# Patient Record
Sex: FEMALE | Race: WHITE | Employment: PART TIME | URBAN - METROPOLITAN AREA
[De-identification: names, ages, dates, MRNs, and addresses within clinical notes are randomized per-mention and may not be internally consistent; named-entity substitution may affect disease eponyms.]

---

## 2017-08-16 ENCOUNTER — OFFICE VISIT - HEALTHEAST (OUTPATIENT)
Dept: FAMILY MEDICINE | Facility: CLINIC | Age: 22
End: 2017-08-16

## 2017-08-16 DIAGNOSIS — S32.10XA SACRAL FRACTURE (H): ICD-10-CM

## 2017-08-16 DIAGNOSIS — S62.91XA HAND FRACTURE, RIGHT: ICD-10-CM

## 2017-10-11 ENCOUNTER — OFFICE VISIT (OUTPATIENT)
Dept: URGENT CARE | Facility: URGENT CARE | Age: 22
End: 2017-10-11
Payer: COMMERCIAL

## 2017-10-11 VITALS
WEIGHT: 205.7 LBS | TEMPERATURE: 98.9 F | DIASTOLIC BLOOD PRESSURE: 75 MMHG | SYSTOLIC BLOOD PRESSURE: 121 MMHG | HEART RATE: 101 BPM | OXYGEN SATURATION: 95 %

## 2017-10-11 DIAGNOSIS — J20.9 ACUTE BRONCHITIS WITH COEXISTING CONDITION REQUIRING PROPHYLACTIC TREATMENT: Primary | ICD-10-CM

## 2017-10-11 DIAGNOSIS — R07.0 THROAT PAIN: ICD-10-CM

## 2017-10-11 DIAGNOSIS — J45.21 MILD INTERMITTENT ASTHMA WITH ACUTE EXACERBATION: ICD-10-CM

## 2017-10-11 DIAGNOSIS — R06.02 SOB (SHORTNESS OF BREATH): ICD-10-CM

## 2017-10-11 PROBLEM — J45.20 MILD INTERMITTENT ASTHMA: Status: ACTIVE | Noted: 2017-10-11

## 2017-10-11 PROCEDURE — 94640 AIRWAY INHALATION TREATMENT: CPT | Performed by: FAMILY MEDICINE

## 2017-10-11 PROCEDURE — 99204 OFFICE O/P NEW MOD 45 MIN: CPT | Mod: 25 | Performed by: FAMILY MEDICINE

## 2017-10-11 RX ORDER — PREDNISONE 20 MG/1
40 TABLET ORAL DAILY
Qty: 10 TABLET | Refills: 0 | Status: SHIPPED | OUTPATIENT
Start: 2017-10-11 | End: 2017-10-16

## 2017-10-11 RX ORDER — AZITHROMYCIN 250 MG/1
TABLET, FILM COATED ORAL
Qty: 6 TABLET | Refills: 0 | Status: SHIPPED | OUTPATIENT
Start: 2017-10-11 | End: 2021-07-24

## 2017-10-11 RX ORDER — ALBUTEROL SULFATE 0.83 MG/ML
1 SOLUTION RESPIRATORY (INHALATION) ONCE
Qty: 3 ML | Refills: 0
Start: 2017-10-11 | End: 2017-10-11

## 2017-10-11 RX ORDER — ALBUTEROL SULFATE 90 UG/1
2 AEROSOL, METERED RESPIRATORY (INHALATION) EVERY 6 HOURS PRN
Qty: 1 INHALER | Refills: 0 | Status: SHIPPED | OUTPATIENT
Start: 2017-10-11 | End: 2023-08-02

## 2017-10-11 NOTE — PATIENT INSTRUCTIONS
Okay to take ibuprofen 200 mg - 4 tablets (800 mg) every 8 hours as needed.  Okay to take tylenol 500 mg - 2 tablets (1000 mg) every 6-8 hours as needed, do not exceed 3000 mg in 24 hours.  Take full course of antibiotic for bronchitis.  Take full course of prednisone for asthma flare.    Use albuterol inhaler to help with cough and shortness of breath.  Okay to try magic mouthwash to help with sore throat.      Asthma (Adult)  Asthma is a disease where the medium and  small air passages within the lung go into spasm and restrict the flow of air. Inflammation and swelling of the airways cause further restriction. During an acute asthma attack, these factors cause difficulty breathing, wheezing, cough and chest tightness.    An asthma attack can be triggered by many things. Common triggers include infections such as the common cold, bronchitis, pneumonia. Irritants such as smoke or pollutants in the air, emotional upset, and exercise can also trigger an attack. In many adults with asthma, allergies to dust, mold, pollen and animal dander can cause an asthma attack. Skipping doses of daily asthma medicine can also bring on an asthma attack.  Asthma can be controlled using the proper medicines prescribed by your healthcare provider and avoiding exposure to known triggers including allergens and irritants.  Home care    Take prescribed medicine exactly at the times advised. If you need medicine such as from a hand held inhaler or aerosol breathing machine more than every 4 hours, contact your healthcare provider or seek immediate medical attention. If prescribed an antibiotic or prednisone, take all of the medicine as prescribed, even if you are feeling better after a few days.    Do not smoke. Avoid being exposed to the smoke of others.    Some people with asthma have worsening of their symptoms when they take aspirin and non-steroidal or fever-reducing medicines like ibuprofen and naproxen. Talk to your healthcare  "provider if you think this may apply to you.  Follow-up care  Follow up with your healthcare provider, or as advised. Always bring all of your current medicines to any appointments with your healthcare provider. Also bring a complete list of medications even those not taken for asthma. If you do not already have one, talk to your healthcare provider about developing a personalized \"Asthma Action Plan.\"  A pneumococcal (pneumonia) vaccine and yearly flu shot (every fall) are recommended. Ask your doctor about this.  When to seek medical advice  Call your healthcare provider right away if any of these occur:     Increased wheezing or shortness of breath    Need to use your inhalers more often than usual without relief    Fever of 100.4 F (38 C) or higher, or as directed by your healthcare provider    Coughing up lots of dark-colored or bloody sputum (mucus)    Chest pain with each breath    If you use a peak flow meter as part of an Asthma Action Plan, and you are still in the yellow zone (50% to 80%) 15 minutes after using inhaler medicine.  Call 911  Call 911 if any of the following occur    Trouble walking or talking because of shortness of breath    If you use a peak flow meter as part of an Asthma Action Plan and you are still in the red zone (less than 50%) 15 minutes after using inhaler medicine    Lips or fingernails turning gray or blue  Date Last Reviewed: 12/2/2015 2000-2017 [employer]. [address] [place] [id number]. All rights reserved. This information is not intended as a substitute for professional medical care. Always follow your healthcare professional's instructions.        Bronchitis, Antibiotic Treatment (Adult)    Bronchitis is an infection of the air passages (bronchial tubes) in your lungs. It often occurs when you have a cold. This illness is contagious during the first few days and is spread through the air by coughing and sneezing, or by direct contact (touching " the sick person and then touching your own eyes, nose, or mouth).  Symptoms of bronchitis include cough with mucus (phlegm) and low-grade fever. Bronchitis usually lasts 7 to 14 days. Mild cases can be treated with simple home remedies. More severe infection is treated with an antibiotic.  Home care  Follow these guidelines when caring for yourself at home:    If your symptoms are severe, rest at home for the first 2 to 3 days. When you go back to your usual activities, don't let yourself get too tired.    Do not smoke. Also avoid being exposed to secondhand smoke.    You may use over-the-counter medicines to control fever or pain, unless another medicine was prescribed. (Note: If you have chronic liver or kidney disease or have ever had a stomach ulcer or gastrointestinal bleeding, talk with your healthcare provider before using these medicines. Also talk to your provider if you are taking medicine to prevent blood clots.) Aspirin should never be given to anyone younger than 18 years of age who is ill with a viral infection or fever. It may cause severe liver or brain damage.    Your appetite may be poor, so a light diet is fine. Avoid dehydration by drinking 6 to 8 glasses of fluids per day (such as water, soft drinks, sports drinks, juices, tea, or soup). Extra fluids will help loosen secretions in the nose and lungs.    Over-the-counter cough, cold, and sore-throat medicines will not shorten the length of the illness, but they may be helpful to reduce symptoms. (Note: Do not use decongestants if you have high blood pressure.)    Finish all antibiotic medicine. Do this even if you are feeling better after only a few days.  Follow-up care  Follow up with your healthcare provider, or as advised. If you had an X-ray or ECG (electrocardiogram), a specialist will review it. You will be notified of any new findings that may affect your care.  Note: If you are age 65 or older, or if you have a chronic lung disease or  condition that affects your immune system, or you smoke, talk to your healthcare provider about having pneumococcal vaccinations and a yearly influenza vaccination (flu shot).  When to seek medical advice  Call your healthcare provider right away if any of these occur:    Fever of 100.4 F (38 C) or higher    Coughing up increased amounts of colored sputum    Weakness, drowsiness, headache, facial pain, ear pain, or a stiff neck  Call 911, or get immediate medical care  Contact emergency services right away if any of these occur.    Coughing up blood    Worsening weakness, drowsiness, headache, or stiff neck    Trouble breathing, wheezing, or pain with breathing  Date Last Reviewed: 9/13/2015 2000-2017 [employer]. [address] [place] [address], [place] [address]. All rights reserved. This information is not intended as a substitute for professional medical care. Always follow your healthcare professional's instructions.

## 2017-10-11 NOTE — NURSING NOTE
The following nebulizer treatment was given:     MEDICATION: Albuterol Sulfate 2.5 mg  : [employer]  LOT #: [id number]  EXPIRATION DATE:  12/01/2018  NDC # 2350-7057-32  Joseph Ortiz CMA ([facility]) 10/11/2017 4:15 PM
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
## 2017-10-11 NOTE — MR AVS SNAPSHOT
After Visit Summary   10/11/2017    Shira Matthew    MRN: [id number]           Patient Information     Date Of Birth          1995        Visit Information        Provider Department      10/11/2017 3:10 PM Shreyas Jacobson MD [facility]        Today's Diagnoses     Acute bronchitis with coexisting condition requiring prophylactic treatment    -  1    SOB (shortness of breath)        Mild intermittent asthma with acute exacerbation        Throat pain          Care Instructions    Okay to take ibuprofen 200 mg - 4 tablets (800 mg) every 8 hours as needed.  Okay to take tylenol 500 mg - 2 tablets (1000 mg) every 6-8 hours as needed, do not exceed 3000 mg in 24 hours.  Take full course of antibiotic for bronchitis.  Take full course of prednisone for asthma flare.    Use albuterol inhaler to help with cough and shortness of breath.  Okay to try magic mouthwash to help with sore throat.      Asthma (Adult)  Asthma is a disease where the medium and  small air passages within the lung go into spasm and restrict the flow of air. Inflammation and swelling of the airways cause further restriction. During an acute asthma attack, these factors cause difficulty breathing, wheezing, cough and chest tightness.    An asthma attack can be triggered by many things. Common triggers include infections such as the common cold, bronchitis, pneumonia. Irritants such as smoke or pollutants in the air, emotional upset, and exercise can also trigger an attack. In many adults with asthma, allergies to dust, mold, pollen and animal dander can cause an asthma attack. Skipping doses of daily asthma medicine can also bring on an asthma attack.  Asthma can be controlled using the proper medicines prescribed by your healthcare provider and avoiding exposure to known triggers including allergens and irritants.  Home care    Take prescribed medicine exactly at the times advised. If you need medicine such as from a hand  "held inhaler or aerosol breathing machine more than every 4 hours, contact your healthcare provider or seek immediate medical attention. If prescribed an antibiotic or prednisone, take all of the medicine as prescribed, even if you are feeling better after a few days.    Do not smoke. Avoid being exposed to the smoke of others.    Some people with asthma have worsening of their symptoms when they take aspirin and non-steroidal or fever-reducing medicines like ibuprofen and naproxen. Talk to your healthcare provider if you think this may apply to you.  Follow-up care  Follow up with your healthcare provider, or as advised. Always bring all of your current medicines to any appointments with your healthcare provider. Also bring a complete list of medications even those not taken for asthma. If you do not already have one, talk to your healthcare provider about developing a personalized \"Asthma Action Plan.\"  A pneumococcal (pneumonia) vaccine and yearly flu shot (every fall) are recommended. Ask your doctor about this.  When to seek medical advice  Call your healthcare provider right away if any of these occur:     Increased wheezing or shortness of breath    Need to use your inhalers more often than usual without relief    Fever of 100.4 F (38 C) or higher, or as directed by your healthcare provider    Coughing up lots of dark-colored or bloody sputum (mucus)    Chest pain with each breath    If you use a peak flow meter as part of an Asthma Action Plan, and you are still in the yellow zone (50% to 80%) 15 minutes after using inhaler medicine.  Call 911  Call 911 if any of the following occur    Trouble walking or talking because of shortness of breath    If you use a peak flow meter as part of an Asthma Action Plan and you are still in the red zone (less than 50%) 15 minutes after using inhaler medicine    Lips or fingernails turning gray or blue  Date Last Reviewed: 12/2/2015 2000-2017 [employer] " [address] [place] [address]. All rights reserved. This information is not intended as a substitute for professional medical care. Always follow your healthcare professional's instructions.        Bronchitis, Antibiotic Treatment (Adult)    Bronchitis is an infection of the air passages (bronchial tubes) in your lungs. It often occurs when you have a cold. This illness is contagious during the first few days and is spread through the air by coughing and sneezing, or by direct contact (touching the sick person and then touching your own eyes, nose, or mouth).  Symptoms of bronchitis include cough with mucus (phlegm) and low-grade fever. Bronchitis usually lasts 7 to 14 days. Mild cases can be treated with simple home remedies. More severe infection is treated with an antibiotic.  Home care  Follow these guidelines when caring for yourself at home:    If your symptoms are severe, rest at home for the first 2 to 3 days. When you go back to your usual activities, don't let yourself get too tired.    Do not smoke. Also avoid being exposed to secondhand smoke.    You may use over-the-counter medicines to control fever or pain, unless another medicine was prescribed. (Note: If you have chronic liver or kidney disease or have ever had a stomach ulcer or gastrointestinal bleeding, talk with your healthcare provider before using these medicines. Also talk to your provider if you are taking medicine to prevent blood clots.) Aspirin should never be given to anyone younger than 18 years of age who is ill with a viral infection or fever. It may cause severe liver or brain damage.    Your appetite may be poor, so a light diet is fine. Avoid dehydration by drinking 6 to 8 glasses of fluids per day (such as water, soft drinks, sports drinks, juices, tea, or soup). Extra fluids will help loosen secretions in the nose and lungs.    Over-the-counter cough, cold, and sore-throat medicines will not shorten the length of the  illness, but they may be helpful to reduce symptoms. (Note: Do not use decongestants if you have high blood pressure.)    Finish all antibiotic medicine. Do this even if you are feeling better after only a few days.  Follow-up care  Follow up with your healthcare provider, or as advised. If you had an X-ray or ECG (electrocardiogram), a specialist will review it. You will be notified of any new findings that may affect your care.  Note: If you are age 65 or older, or if you have a chronic lung disease or condition that affects your immune system, or you smoke, talk to your healthcare provider about having pneumococcal vaccinations and a yearly influenza vaccination (flu shot).  When to seek medical advice  Call your healthcare provider right away if any of these occur:    Fever of 100.4 F (38 C) or higher    Coughing up increased amounts of colored sputum    Weakness, drowsiness, headache, facial pain, ear pain, or a stiff neck  Call 911, or get immediate medical care  Contact emergency services right away if any of these occur.    Coughing up blood    Worsening weakness, drowsiness, headache, or stiff neck    Trouble breathing, wheezing, or pain with breathing  Date Last Reviewed: 9/13/2015 2000-2017 [employer]. [address] [place] [address]. All rights reserved. This information is not intended as a substitute for professional medical care. Always follow your healthcare professional's instructions.                Follow-ups after your visit        Who to contact     If you have questions or need follow up information about today's clinic visit or your schedule please contact [facility] directly at [phone].  Normal or non-critical lab and imaging results will be communicated to you by MyChart, letter or phone within 4 business days after the clinic has received the results. If you do not hear from us within 7 days, please contact the clinic through [employer] or  "phone. If you have a critical or abnormal lab result, we will notify you by phone as soon as possible.  Submit refill requests through [employer] or call your pharmacy and they will forward the refill request to us. Please allow 3 business days for your refill to be completed.          Additional Information About Your Visit        [employer] Information     [employer] lets you send messages to your doctor, view your test results, renew your prescriptions, schedule appointments and more. To sign up, go to [email or web address] . Click on \"Log in\" on the left side of the screen, which will take you to the Welcome page. Then click on \"Sign up Now\" on the right side of the page.     You will be asked to enter the access code listed below, as well as some personal information. Please follow the directions to create your username and password.     Your access code is: [email or web address]  Expires: 2018  4:44 PM     Your access code will  in 90 days. If you need help or a new code, please call your [place] clinic or [phone].        Care EveryWhere ID     This is your Care EveryWhere ID. This could be used by other organizations to access your [place] medical records  [id number]        Your Vitals Were     Pulse Temperature Pulse Oximetry             101 98.9  F (37.2  C) (Tympanic) 95%          Blood Pressure from Last 3 Encounters:   10/11/17 121/75    Weight from Last 3 Encounters:   10/11/17 205 lb 11.2 oz (93.3 kg)              We Performed the Following     ALBUTEROL UNIT DOSE, 1 MG -      INHALATION/NEBULIZER TREATMENT, INITIAL          Today's Medication Changes          These changes are accurate as of: 10/11/17  4:44 PM.  If you have any questions, ask your nurse or doctor.               Start taking these medicines.        Dose/Directions    * albuterol (2.5 MG/3ML) 0.083% neb solution   Used for:  SOB (shortness of breath)   Started by:  Shreyas Jacobson MD        Dose:  1 vial   Take 1 vial (2.5 mg) by " nebulization once for 1 dose   Quantity:  3 mL   Refills:  0       * albuterol 108 (90 BASE) MCG/ACT Inhaler   Commonly known as:  PROAIR HFA/PROVENTIL HFA/VENTOLIN HFA   Used for:  Mild intermittent asthma with acute exacerbation   Started by:  Shreyas Jacobson MD        Dose:  2 puff   Inhale 2 puffs into the lungs every 6 hours as needed for shortness of breath / dyspnea or wheezing   Quantity:  1 Inhaler   Refills:  0       azithromycin 250 MG tablet   Commonly known as:  ZITHROMAX   Used for:  Acute bronchitis with coexisting condition requiring prophylactic treatment   Started by:  Shreyas Jacobson MD        Two tablets first day, then one tablet daily for four days.   Quantity:  6 tablet   Refills:  0       lidocaine visc 2% 2.5mL/5mL & maalox/mylanta w/ simeth 2.5mL/5mL & diphenhydrAMINE 5mg/5mL Susp suspension   Commonly known as:  [facility]   Used for:  Throat pain   Started by:  Shreyas Jacobson MD        Dose:  10 mL   Swish and swallow 10 mLs in mouth every 6 hours as needed for sore throat   Quantity:  120 mL   Refills:  0       predniSONE 20 MG tablet   Commonly known as:  DELTASONE   Used for:  Mild intermittent asthma with acute exacerbation   Started by:  Shreyas Jacobson MD        Dose:  40 mg   Take 2 tablets (40 mg) by mouth daily for 5 days   Quantity:  10 tablet   Refills:  0       * Notice:  This list has 2 medication(s) that are the same as other medications prescribed for you. Read the directions carefully, and ask your doctor or other care provider to review them with you.         Where to get your medicines      These medications were sent to [facility] - [address]   [address], [place] [address]     Phone:  [phone]     albuterol 108 (90 BASE) MCG/ACT Inhaler    azithromycin 250 MG tablet    predniSONE 20 MG tablet         Some of these will need a paper prescription and others can be bought over the counter.  Ask  your nurse if you have questions.     Bring a paper prescription for each of these medications     lidocaine visc 2% 2.5mL/5mL & maalox/mylanta w/ simeth 2.5mL/5mL & diphenhydrAMINE 5mg/5mL Susp suspension       You don't need a prescription for these medications     albuterol (2.5 MG/3ML) 0.083% neb solution                Primary Care Provider Office Phone # Fax #    [facility] [phone] [address] [facility] [address]        Equal Access to Services     [facility]: Hadii aad ku hadasho Soomaali, waaxda luqadaha, qaybta kaalmada aderobertoyaregla, andrea tripp . So [facility] [phone].    ATENCIÓN: Si habla español, tiene a crespo disposición servicios gratuitos de asistencia lingüística. [facility] [phone].    We comply with applicable federal civil rights laws and [place] laws. We do not discriminate on the basis of race, color, national origin, age, disability, sex, sexual orientation, or gender identity.            Thank you!     Thank you for choosing [facility]  for your care. Our goal is always to provide you with excellent care. Hearing back from our patients is one way we can continue to improve our services. Please take a few minutes to complete the written survey that you may receive in the mail after your visit with us. Thank you!             Your Updated Medication List - Protect others around you: Learn how to safely use, store and throw away your medicines at www.disposemymeds.org.          This list is accurate as of: 10/11/17  4:44 PM.  Always use your most recent med list.                   Brand Name Dispense Instructions for use Diagnosis    * albuterol (2.5 MG/3ML) 0.083% neb solution     3 mL    Take 1 vial (2.5 mg) by nebulization once for 1 dose    SOB (shortness of breath)       * albuterol 108 (90 BASE) MCG/ACT Inhaler    PROAIR HFA/PROVENTIL HFA/VENTOLIN HFA    1 Inhaler    Inhale 2 puffs into the  lungs every 6 hours as needed for shortness of breath / dyspnea or wheezing    Mild intermittent asthma with acute exacerbation       azithromycin 250 MG tablet    ZITHROMAX    6 tablet    Two tablets first day, then one tablet daily for four days.    Acute bronchitis with coexisting condition requiring prophylactic treatment       lidocaine visc 2% 2.5mL/5mL & maalox/mylanta w/ simeth 2.5mL/5mL & diphenhydrAMINE 5mg/5mL Susp suspension    [facility]    120 mL    Swish and swallow 10 mLs in mouth every 6 hours as needed for sore throat    Throat pain       predniSONE 20 MG tablet    DELTASONE    10 tablet    Take 2 tablets (40 mg) by mouth daily for 5 days    Mild intermittent asthma with acute exacerbation       * Notice:  This list has 2 medication(s) that are the same as other medications prescribed for you. Read the directions carefully, and ask your doctor or other care provider to review them with you.

## 2017-10-11 NOTE — NURSING NOTE
Chief Complaint   Patient presents with     Urgent Care     URI     Pt states has congestion, cough, and runny nose x 1 week. Pt has taken otc medications.        Initial /75 (BP Location: Right arm, Patient Position: Chair, Cuff Size: Adult Large)  Pulse 101  Temp 98.9  F (37.2  C) (Tympanic)  Wt 205 lb 11.2 oz (93.3 kg)  SpO2 95% There is no height or weight on file to calculate BMI.  Medication Reconciliation: unable or not appropriate to perform   Joseph Ortiz CMA (MA) 10/11/2017 3:39 PM

## 2017-10-18 ENCOUNTER — OFFICE VISIT - HEALTHEAST (OUTPATIENT)
Dept: FAMILY MEDICINE | Facility: CLINIC | Age: 22
End: 2017-10-18

## 2017-10-18 DIAGNOSIS — R05.9 COUGH: ICD-10-CM

## 2017-10-18 DIAGNOSIS — J40 BRONCHITIS: ICD-10-CM

## 2017-11-06 ENCOUNTER — OFFICE VISIT - HEALTHEAST (OUTPATIENT)
Dept: FAMILY MEDICINE | Facility: CLINIC | Age: 22
End: 2017-11-06

## 2017-11-06 DIAGNOSIS — M54.42 ACUTE LEFT-SIDED LOW BACK PAIN WITH LEFT-SIDED SCIATICA: ICD-10-CM

## 2017-11-06 DIAGNOSIS — J35.1 ENLARGED TONSILS: ICD-10-CM

## 2017-11-06 DIAGNOSIS — R05.3 CHRONIC COUGH: ICD-10-CM

## 2017-11-06 DIAGNOSIS — D72.829 LEUKOCYTOSIS, UNSPECIFIED TYPE: ICD-10-CM

## 2017-11-06 ASSESSMENT — MIFFLIN-ST. JEOR: SCORE: 1731.11

## 2017-11-27 ENCOUNTER — COMMUNICATION - HEALTHEAST (OUTPATIENT)
Dept: OTOLARYNGOLOGY | Facility: CLINIC | Age: 22
End: 2017-11-27

## 2017-12-06 ENCOUNTER — COMMUNICATION - HEALTHEAST (OUTPATIENT)
Dept: FAMILY MEDICINE | Facility: CLINIC | Age: 22
End: 2017-12-06

## 2017-12-27 ENCOUNTER — TELEPHONE (OUTPATIENT)
Dept: PEDIATRICS | Facility: CLINIC | Age: 22
End: 2017-12-27

## 2017-12-27 ENCOUNTER — NURSE TRIAGE (OUTPATIENT)
Dept: NURSING | Facility: CLINIC | Age: 22
End: 2017-12-27

## 2017-12-27 NOTE — TELEPHONE ENCOUNTER
Patient has been unable to keep anything down since yesterday evening.   To assist with vomiting, allow the stomach to rest for an hour or so. Then start by taking small sips or drinking fluids by tablespoon every minute or so to help trick the stomach into thinking nothing is in it. If vomiting returns then repeat. Gradually increase the amount of fluid intake until back to normal. Once vomiting does subside then work on preventing diarrhea as it often occurs after vomiting.     To help prevent or slow diarrhea, try the Brat diet. It is called that to help you remember the foods to start out with. B=bananas, R=rice (you can cook in a little chicken broth for flavor but no butter on it), A=applesauce, T=toast(no butter on the toast but can use a little bit of jelly. You can also give cooked gold vegetables like carrots, squash, or sweet potatoes. You can give chicken with the skin taken off, or chicken noodle soup as long as you put it in the refrigerator and allow the fat to come to the top of the can and scrape it off and make with a little extra water.  Avoid foods that are high in salt, fat, and sugar as they can make diarrhea worse.   Maxine Paris RN

## 2017-12-27 NOTE — TELEPHONE ENCOUNTER
Shira has been vomiting since last night.  Shira has be sweating and has chills no thermometer.  Shira also has diarrhea.    Shira denies blood and bile in vomit.  Shira is hydrated currently.

## 2017-12-27 NOTE — TELEPHONE ENCOUNTER
Additional Information    Negative: Shock suspected (very weak, limp, not moving, too weak to stand, pale cool skin)    Negative: Sounds like a life-threatening emergency to the triager    Negative: Vomiting occurs without diarrhea    Negative: Diarrhea is the main symptom (vomiting is resolved)    Negative: [1] Vomiting and/or diarrhea is present AND [2] age > 1 year AND [3] ate spoiled food in previous 12 hours    Negative: [1] Diarrhea present AND [2] sounds like infant spitting up (reflux)    Negative: Severe dehydration suspected (very dizzy when tries to stand or has fainted)    Negative: [1] Blood (red or coffee grounds color) in the vomit AND [2] not from a nosebleed  (Exception: Few streaks AND only occurs once AND age > 1 year)    Negative: Difficult to awaken    Negative: Confused (delirious) when awake    Negative: Poisoning suspected (with a medicine, plant or chemical)    Negative: [1] Age < 12 weeks AND [2] fever 100.4 F (38.0 C) or higher rectally    Negative: [1]  (< 1 month old) AND [2] starts to look or act abnormal in any way (e.g., decrease in activity or feeding)    Negative: [1] Bile (green color) in the vomit AND [2] 2 or more times (Exception: Stomach juice which is yellow)    Negative: [1] Age < 12 months AND [2] bile (green color) in the vomit (Exception: Stomach juice which is yellow)    Negative: [1] SEVERE abdominal pain (when not vomiting) AND [2] present > 1 hour    Negative: Appendicitis suspected (e.g., constant pain > 2 hours, RLQ location, walks bent over holding abdomen, jumping makes pain worse, etc)    Negative: [1] Blood in the diarrhea AND [2] 3 or more times (or large amount)    Negative: [1] Dehydration suspected AND [2] age < 1 year (Signs: no urine > 8 hours AND very dry mouth, no tears, ill appearing, etc.)    Negative: [1] Dehydration suspected AND [2] age > 1 year (Signs: no urine > 12 hours AND very dry mouth, no tears, ill appearing, etc.)    Negative:  High-risk child (e.g., diabetes mellitus, recent abdominal surgery)    Negative: [1] Fever AND [2] > 105 F (40.6 C) by any route OR axillary > 104 F (40 C)    Negative: [1] Fever AND [2] weak immune system (sickle cell disease, HIV, splenectomy, chemotherapy, organ transplant, chronic oral steroids, etc)    Negative: Child sounds very sick or weak to the triager    Negative: [1] Age < 12 weeks AND [2] vomited 3 or more times in last 24 hours  (Exception: reflux or spitting up)    Negative: [1] Age < 1 year old AND [2] after receiving frequent sips of ORS per guideline AND [3] continues to vomit 3 or more times AND [4] also has frequent watery diarrhea    Negative: [1] SEVERE vomiting (vomiting everything) > 8 hours (> 12 hours for > 5 yo) AND [2] continues after giving frequent sips of ORS using correct technique per guideline    Negative: [1] Continuous abdominal pain or crying AND [2] persists > 2 hours  (Caution: intermittent abdominal pain that comes on with vomiting and then goes away is common)    Negative: Vomiting an essential medicine    Negative: [1] Recent hospitalization AND [2] child not improved or WORSE    Negative: [1] Age < 1 year old AND [2] MODERATE vomiting (3-7 times/day) with diarrhea AND [3] present > 24 hours    Negative: [1] Age > 1 year old AND [2] MODERATE vomiting (3-7 times/day) with diarrhea AND [3] present > 48 hours    Negative: [1] Blood in the stool AND [2] 1 or 2 times AND [3] small amount    Negative: Fever present > 3 days (72 hours)    Negative: [1] MILD vomiting (1-2 times/day) with diarrhea AND [2] persists > 1 week    Negative: Vomiting is a chronic problem (recurrent or ongoing AND present > 4 weeks)    [1] SEVERE vomiting (8 or more times/day OR vomits everything) with diarrhea BUT [2] hydrated    Protocols used: [facility]

## 2018-03-04 ENCOUNTER — COMMUNICATION - HEALTHEAST (OUTPATIENT)
Dept: SCHEDULING | Facility: CLINIC | Age: 23
End: 2018-03-04

## 2018-03-05 ENCOUNTER — OFFICE VISIT - HEALTHEAST (OUTPATIENT)
Dept: FAMILY MEDICINE | Facility: CLINIC | Age: 23
End: 2018-03-05

## 2018-03-05 DIAGNOSIS — R13.10 DYSPHAGIA: ICD-10-CM

## 2018-03-05 DIAGNOSIS — J40 BRONCHITIS: ICD-10-CM

## 2018-03-05 DIAGNOSIS — J03.91 RECURRENT TONSILLITIS: ICD-10-CM

## 2018-03-05 DIAGNOSIS — J45.901 ASTHMA EXACERBATION: ICD-10-CM

## 2018-03-05 DIAGNOSIS — J02.9 SORE THROAT: ICD-10-CM

## 2018-03-05 DIAGNOSIS — R05.9 COUGH: ICD-10-CM

## 2018-03-05 LAB — DEPRECATED S PYO AG THROAT QL EIA: NORMAL

## 2018-03-05 ASSESSMENT — MIFFLIN-ST. JEOR: SCORE: 1753.79

## 2018-03-06 LAB — GROUP A STREP BY PCR: NORMAL

## 2018-03-27 ENCOUNTER — OFFICE VISIT - HEALTHEAST (OUTPATIENT)
Dept: FAMILY MEDICINE | Facility: CLINIC | Age: 23
End: 2018-03-27

## 2018-03-27 DIAGNOSIS — J45.30 MILD PERSISTENT ASTHMA: ICD-10-CM

## 2018-03-27 DIAGNOSIS — M54.50 LOWER BACK PAIN: ICD-10-CM

## 2018-03-29 ENCOUNTER — OFFICE VISIT - HEALTHEAST (OUTPATIENT)
Dept: OTOLARYNGOLOGY | Facility: CLINIC | Age: 23
End: 2018-03-29

## 2018-03-29 DIAGNOSIS — J35.01 CHRONIC TONSILLITIS: ICD-10-CM

## 2018-11-15 ENCOUNTER — COMMUNICATION - HEALTHEAST (OUTPATIENT)
Dept: SCHEDULING | Facility: CLINIC | Age: 23
End: 2018-11-15

## 2019-01-31 ENCOUNTER — NURSE TRIAGE (OUTPATIENT)
Dept: NURSING | Facility: CLINIC | Age: 24
End: 2019-01-31

## 2019-01-31 NOTE — TELEPHONE ENCOUNTER
[facility] triage call :   Presenting problem : Pt called.   My  Period has last 2 weeks and cramping is like normal period  , no BCP or recent pregnancy .  Currently : no injury or fever ,  tampon changing it  6x s daily - and passing clots up to joy size . Not using Birth control and sexually active .  Hx = Periods are regular   Guideline used : vaginal bleeding - abnormal - adult   Disposition and recommendations : see provider in 4 hours , unsure what Pt will do .   Caller verbalizes understanding and denies further questions and will call back if further symptoms to triage or questions  . Karla Suarez RN  - [place] Nurse Advisor       Reason for Disposition    MODERATE vaginal bleeding (i.e., soaking 1 pad or tampon per hour and present > 6 hours)    Additional Information    Negative: Shock suspected (e.g., cold/pale/clammy skin, too weak to stand, low BP, rapid pulse)    Negative: Difficult to awaken or acting confused  (e.g., disoriented, slurred speech)    Negative: Passed out (i.e., lost consciousness, collapsed and was not responding)    Negative: Sounds like a life-threatening emergency to the triager    Negative: Followed a genital area injury    Negative: Pregnant > 20 weeks  (5 months or more)    Negative: Pregnant < 20 weeks  (less than 5 months)    Negative: Bleeding occurring > 12 months after menopause    Negative: Bleeding from sexual abuse or rape    Negative: [1] Vaginal discharge is main symptom AND [2] small amount of blood    Negative: SEVERE abdominal pain    Negative: SEVERE dizziness (e.g., unable to stand, requires support to walk, feels like passing out now)    Negative: SEVERE vaginal bleeding (i.e., soaking 2 pads or tampons per hour and present 2 or more hours)    Negative: Patient sounds very sick or weak to the triager    Protocols used: VAGINAL BLEEDING - ABNORMAL-ADULT-

## 2019-03-28 ENCOUNTER — COMMUNICATION - HEALTHEAST (OUTPATIENT)
Dept: TELEHEALTH | Facility: CLINIC | Age: 24
End: 2019-03-28

## 2019-03-28 ENCOUNTER — OFFICE VISIT - HEALTHEAST (OUTPATIENT)
Dept: FAMILY MEDICINE | Facility: CLINIC | Age: 24
End: 2019-03-28

## 2019-03-28 ENCOUNTER — COMMUNICATION - HEALTHEAST (OUTPATIENT)
Dept: FAMILY MEDICINE | Facility: CLINIC | Age: 24
End: 2019-03-28

## 2019-03-28 DIAGNOSIS — M53.3 COCCYDYNIA: ICD-10-CM

## 2019-04-10 ENCOUNTER — COMMUNICATION - HEALTHEAST (OUTPATIENT)
Dept: SCHEDULING | Facility: CLINIC | Age: 24
End: 2019-04-10

## 2019-04-15 ENCOUNTER — COMMUNICATION - HEALTHEAST (OUTPATIENT)
Dept: ADMINISTRATIVE | Facility: CLINIC | Age: 24
End: 2019-04-15

## 2019-04-28 ENCOUNTER — OFFICE VISIT - HEALTHEAST (OUTPATIENT)
Dept: FAMILY MEDICINE | Facility: CLINIC | Age: 24
End: 2019-04-28

## 2019-04-28 DIAGNOSIS — J02.9 EXUDATIVE PHARYNGITIS: ICD-10-CM

## 2019-04-28 LAB — DEPRECATED S PYO AG THROAT QL EIA: NORMAL

## 2019-04-29 LAB — GROUP A STREP BY PCR: NORMAL

## 2020-04-04 ENCOUNTER — COMMUNICATION - HEALTHEAST (OUTPATIENT)
Dept: SCHEDULING | Facility: CLINIC | Age: 25
End: 2020-04-04

## 2021-05-27 NOTE — TELEPHONE ENCOUNTER
Medication Question or Clarification  Who is calling: Patient  What medication are you calling about? (include dose and sig) oxyCODONE-acetaminophen (PERCOCET/ENDOCET) 5-325 mg per tablet   Who prescribed the medication?:  Rickey  What is your question/concern?:  Patient is only able to fill her Prescription for the 7 pills, for 7 days.  Will need a PA for a continuation of medication  Pharmacy:  [facility]  Okay to leave a detailed message?: Yes  Site CMT - Please call the pharmacy to obtain any additional needed information.
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
## 2021-05-27 NOTE — TELEPHONE ENCOUNTER
"PCP none, HE clinic= [facility]   Patient states she is in severe pain=\"10\" from her wisdom teeth (x4 \"sideways\").  She was seen by DDS today and told she needs all 4 wisdom teeth removed. Having difficulty finding an oral surgeon that accepts her insurance. She called her insurance company, but the names she was given had no openings available until June. The dentist told her what she could do to manage the pain (such as Tylenol and Ibuprofen and ice pack), but it is not helping.   She is taking Ibuprofen 800mg every 6 hrs, and Extra Strength Tylenol every 4 hrs. Can't eat. It hurts to touch the face. It hurts to talk. Can't sleep.    Reason for Disposition    [1] SEVERE pain (e.g., excruciating, unable to do any normal activities) AND [2] not improved 2 hours after pain medicine    Protocols used: TOOTHACHE-A-AH    Triaged to a disposition of See physician within 4 hrs--discussed option of ER, which she intends to do.     Heidy Covarrubias RN [facility] Triage Nurse  "

## 2021-05-27 NOTE — TELEPHONE ENCOUNTER
Patient will need to see Primary Care if she needs more than a 7-day supply of this medication. Prior Authorization will not be completed at this time. Please advise patient and assist with Primary Care scheduling.     Armin Lang MD 03/28/19 12:54 PM

## 2021-05-31 VITALS — WEIGHT: 213 LBS | BODY MASS INDEX: 33.43 KG/M2 | HEIGHT: 67 IN

## 2021-05-31 VITALS — WEIGHT: 208 LBS | HEIGHT: 67 IN | BODY MASS INDEX: 32.65 KG/M2

## 2021-05-31 VITALS — WEIGHT: 208 LBS | BODY MASS INDEX: 32.58 KG/M2

## 2021-05-31 VITALS — HEIGHT: 67 IN | BODY MASS INDEX: 29.76 KG/M2

## 2021-06-01 VITALS — WEIGHT: 217 LBS | BODY MASS INDEX: 33.99 KG/M2

## 2021-06-02 VITALS — BODY MASS INDEX: 33.83 KG/M2 | WEIGHT: 216 LBS

## 2021-06-02 VITALS — BODY MASS INDEX: 32.89 KG/M2 | WEIGHT: 210 LBS

## 2021-06-07 NOTE — TELEPHONE ENCOUNTER
Patient calling - says her throat has been hurting for the past 4 days.  Says she looked in the mirror - says she has white spots on her tonsils.  Describes pain as severe.    No difficulty breathing.  No difficulty swallowing.  Drinking enough to stay hydrated - last urinated 1 hour ago.   No known fever.    Triaged to disposition of See Physician Within 24 Hours.  Advised patient to go to [email or web address] to set up virtual visit.  Care advice given per protocol.    Advised patient to call back if symptoms worsen.    Qi Walker RN  Triage Nurse Advisor      COVID 19 Nurse Triage Plan/Patient Instructions    Please be aware that novel coronavirus (COVID-19) may be circulating in the community. If you develop symptoms such as fever, cough, or SOB or if you have concerns about the presence of another infection including coronavirus (COVID-19), please contact your health care provider or visit [email or web address].     Disposition/Instructions    Patient to have an [facility] Visit with a provider (Preferred option). Follow System Ambulatory Workflow for COVID 19. It is recommended that you setup an [facility] Visit with one of our virtual providers.  To do this follow these instructions:    1. Go to the website [email or web address]  2. Create an account (you will need your insurance information)  3. Start a new visit  4. Choose your diagnosis (e.g. COVID19)  5. Fill out the information about your symptoms  6. A provider will reach out to you by text, phone call or video visit based on your request    Call Back If: Your symptoms worsen before you are able to complete your [facility] Visit with a provider.    Thank you for limiting contact with others, wearing a simple mask to cover your cough, practice good hand hygiene habits and accessing our virtual services where possible to limit the spread of this virus.    For more information about COVID19 and options for caring for yourself at home, please visit the CDC website at  https://www.cdc.gov/coronavirus/2019-ncov/about/steps-when-sick.html  For more options for care at [facility], please visit our website at [email or web address]    For more information, please use the [place] Department of Health ([facility]) COVID-19 Hotlines (Interpreters available):     Health questions: Phone Number: [phone] or [phone] and Hours: 7 a.m. to 7 p.m.    Schools and  questions: Phone Number: [phone] or [phone] and Hours 7 a.m. to 7 p.m.                            Reason for Disposition    SEVERE (e.g., excruciating) throat pain    Protocols used: SORE THROAT-A-AH
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
## 2021-06-16 NOTE — PROGRESS NOTES
Assessment/Plan:     Patient presents with symptoms of a sore throat and slightly enlarged tonsils without any evidence for occlusion of the airway, or exudates.  She had tested negative for strep 1 day ago, repeat testing today demonstrated a negative result.  Discussed with patient at length that should she have any respiratory compromise, concern for enlarging tonsils, or any difficulty breathing whatsoever, she needs to present immediately to emergency department including calling an ambulance.  Patient is amenable.    Bronchitis:  -Azithromycin (although not always indicated in bronchitis, given the asthma underlying pathology, feel this is appropriate, as well as with patient's duration of symptoms and worsening course)  -Continue guaifenesin with codeine, recommended this only be used at night  -Continue symptomatic treatment    Asthma exacerbation in the setting of bronchitis:  -Ordered albuterol, patient states she had does not have this inhaler  -3 days of 20 mg prednisone    Dysphasia:  -No significant swelling of tonsils appreciated, exceptional warning signs discussed with patient    Recurrent tonsillitis:  -Referral placed to ear nose and throat again    Problem List Items Addressed This Visit     None      Visit Diagnoses     Sore throat    -  Primary    Relevant Orders    Rapid Strep A Screen-Throat (Completed)    Group A Strep, RNA Direct Detection, Throat    Dysphagia        Relevant Medications    predniSONE (DELTASONE) 20 MG tablet    Other Relevant Orders    Rapid Strep A Screen-Throat (Completed)    Group A Strep, RNA Direct Detection, Throat    Recurrent tonsillitis        Relevant Medications    albuterol (PROAIR HFA;PROVENTIL HFA;VENTOLIN HFA) 90 mcg/actuation inhaler    Other Relevant Orders    Ambulatory referral to ENT    Bronchitis        Relevant Medications    codeine-guaiFENesin (GUAIFENESIN AC)  mg/5 mL liquid    albuterol (PROAIR HFA;PROVENTIL HFA;VENTOLIN HFA) 90  mcg/actuation inhaler    azithromycin (ZITHROMAX Z-FREDY) 250 MG tablet    Asthma exacerbation        Relevant Medications    albuterol (PROAIR HFA;PROVENTIL HFA;VENTOLIN HFA) 90 mcg/actuation inhaler    Cough              Return to clinic PRN to establish care.    Total time spent with patient was 15 minutes with greater than 50% spent in face-to-face counseling regarding the above plan.    Subjective:      Shira Matthew is a 22 y.o. female who presents to clinic for sore throat and dysphasia.    Patient was swabbed for strep approximately 1 days ago and tested negative.  Now her sore throat is so significant that she is having trouble swallowing.  She does have a history of recurrently swollen/infected tonsils.  An ENT referral was placed back in November by myself, but patient never went because she lost the information. Patient also has exercised-induced asthma but she also thinks that it might be flaring with this illness.    Total duration of illness is 8 days. It is worsening.  Patient endorses: rhinorrhea, congestion, post-nasal drip, sore throat, , and cough intermittently productive of blood tinged sputum but more often dry, SOB, wheezing, fever (greater than 100 but patient is unsure)  Patient denies: chest pain, ear pain, ear discharge, rash, sinus pressure, fatigue, muscle aches  Treatment thus far has consisted of ibuprofen/tylenol, cough syrup (has not been helping), and has now started guaifenesin with codeine, lozenges, drinking tea with honey.  Sick contacts denied.  Recent travel denied.    Past Medical History, Family History, and Social History reviewed.     Current Outpatient Prescriptions on File Prior to Visit   Medication Sig Dispense Refill     cyclobenzaprine (FLEXERIL) 5 MG tablet Take 1 tablet (5 mg total) by mouth 3 (three) times a day as needed for muscle spasms. 90 tablet 0     docusate sodium (COLACE) 100 MG capsule Take 1 capsule (100 mg total) by mouth every 12 (twelve) hours. 60  "capsule 0     fluticasone (FLOVENT HFA) 220 mcg/actuation inhaler Inhale 2 puffs 2 (two) times a day. 1 Inhaler 2     gabapentin (NEURONTIN) 100 MG capsule Take 100 mg by mouth 3 (three) times a day. 30 capsule 0     hydrOXYzine (ATARAX) 25 MG tablet Take 1 tablet (25 mg total) by mouth every 6 (six) hours. 20 tablet 0     medroxyPROGESTERone (DEPO-PROVERA) 150 mg/mL injection Inject 150 mg into the shoulder, thigh, or buttocks every 3 (three) months.       [DISCONTINUED] albuterol (PROAIR HFA;PROVENTIL HFA;VENTOLIN HFA) 90 mcg/actuation inhaler Inhale 2 puffs every 4 (four) hours as needed for wheezing. 1 Inhaler 1     No current facility-administered medications on file prior to visit.        Review of systems is as stated in HPI.  Endorses: fever, fatigue, SOB, wheezing, nausea, vomiting, diarrhea, back pain and headaches  The remainder of the 10 system review is otherwise negative.    Objective:     /80  Pulse (!) 112  Temp 98.3  F (36.8  C)  Ht 5' 7\" (1.702 m)  Wt 213 lb (96.6 kg)  SpO2 98%  BMI 33.36 kg/m2 Body mass index is 33.36 kg/(m^2).    Gen: Alert, NAD, appears stated age, normal hygiene   Eyes: conjunctivae without injection, sclera clear, EOMI  ENT/mouth: nares clear, septum midline, absent rhinorrhea, pharyngeal injection present, tonsils are inflamed but only about 2+, not touching, occupying less than half of the oropharyngeal cavity, neck is supple, no thyroid enlargement, no obvious unilateral masses  CV: RRR, no murmur appreciated  Resp: diffuse ronchi bilaterally, no wheezing, no increased work of breathing, deep breathing caused coughing  ABD: non-tender to palpation, nondistended  MSK: grossly full range of motion in all joints, no obvious deformity  Neuro: CN II-XII grossly intact, no deficits in coordination  Psych: no apparent hallucinations or delusions, no pressured speech; alert, oriented x3  SKIN: dry and without lesions  Heme/lymph: no pallor, no active bleeding/bruising, " no adenopathy appreciated    This note has been dictated using voice recognition software. Any grammatical or context distortions are unintentional and inherent to the the software.     Ashley Benítez MD
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
## 2021-06-17 NOTE — PATIENT INSTRUCTIONS - HE
Patient Instructions by Jon Alfonso PA-C at 4/28/2019 11:30 AM     Author: Jon Alfonso PA-C Service: -- Author Type: Physician Assistant    Filed: 4/28/2019 12:11 PM Encounter Date: 4/28/2019 Status: Addendum    : Jon Alfonso PA-C (Physician Assistant)    Related Notes: Original Note by Jon Alfonso PA-C (Physician Assistant) filed at 4/28/2019 12:10 PM       Suggested increased rest increased fluids and bedside humidification  Over-the-counter Tylenol for comfort.  Follow packaging directions  Over-the-counter throat lozenges with benzocaine such as Cepacol may be used if indicated and is not a choking hazard based on age.  Follow packaging directions.  Do not overuse the benzocaine as it will dry the throat and make it uncomfortable.  Noncontagious after 24 hours on the antibiotic.  Change toothbrush out after 48 hours to avoid reinfecting the mouth.  Follow-up after completion of the antibiotic if any new symptoms or concerns arise.      Self-Care for Sore Throats  Sore throats happen for many reasons, such as colds, allergies, and infections caused by viruses or bacteria. In any case, your throat becomes red and sore. Your goal for self-care is to reduce your discomfort while giving your throat a chance to heal.    Moisten and soothe your throat  Tips include the following:    Try a sip of water first thing after waking up.    Keep your throat moist by drinking 6 or more glasses of clear liquids every day.    Run a cool-air humidifier in your room overnight.    Avoid cigarette smoke.     Suck on throat lozenges, cough drops, hard candy, ice chips, or frozen fruit-juice bars. Use the sugar-free versions if your diet or medical condition requires them.  Gargle to ease irritation  Gargling every hour or 2 can ease irritation. Try gargling with 1 of these solutions:    1/4 teaspoon of salt in 1/2 cup of warm water    An over-the-counter anesthetic gargle  Use medicine for more relief  Over-the-counter  medicine can reduce sore throat symptoms. Ask your pharmacist if you have questions about which medicine to use:    Ease pain with anesthetic sprays. Aspirin or an aspirin substitute also helps. Remember, never give aspirin to anyone 18 or younger, or if you are already taking blood thinners.     For sore throats caused by allergies, try antihistamines to block the allergic reaction.    Remember: unless a sore throat is caused by a bacterial infection, antibiotics wont help you.  Prevent future sore throats  Prevention tips include the following:    Stop smoking or reduce contact with secondhand smoke. Smoke irritates the tender throat lining.    Limit contact with pets and with allergy-causing substances, such as pollen and mold.    When youre around someone with a sore throat or cold, wash your hands often to keep viruses or bacteria from spreading.    Dont strain your vocal cords.  Call your healthcare provider  Contact your healthcare provider if you have:    A temperature over 101 F (38.3 C)    White spots on the throat    Great difficulty swallowing    Trouble breathing    A skin rash    Recent exposure to someone else with strep bacteria    Severe hoarseness and swollen glands in the neck or jaw   Date Last Reviewed: 8/1/2016 2000-2016 [employer]. [address] [place] [address], [place] [address]. All rights reserved. This information is not intended as a substitute for professional medical care. Always follow your healthcare professional's instructions.

## 2021-06-17 NOTE — PATIENT INSTRUCTIONS - HE
Patient Instructions by Armin Lang MD at 3/28/2019 10:40 AM     Author: Armin Lang MD Service: -- Author Type: Physician    Filed: 3/28/2019 11:30 AM Encounter Date: 3/28/2019 Status: Addendum    : Armin Lang MD (Physician)    Related Notes: Original Note by Armin Lang MD (Physician) filed at 3/28/2019 11:30 AM       - Take ibuprofen and acetaminophen as directed.   - Substitute your evening dose of acetaminophen with oxycodone / acetaminophen if needed for severe pain that prevents you from sleeping. Oxycodone / acetaminophen cannot be prescribed on an ongoing basis from the ER or Walk In Care.   - Have referred you to Physical Therapy for evaluation / treatment of your chronic coccydynia. You should receive a call from the Specialty Schedulers within 1-2 business days. Please call [facility] at [phone] to check the status of this referral if you have not received any information regarding this appointment within 3 business days.   - See [facility] as scheduled next month.   - Work excuse provided for your absence yesterday and today.       Patient Education     Understanding Coccygodynia    Coccygodynia is pain at the lowest tip of the spine (the coccyx, or tailbone). This is sometimes called a bruised tailbone. Tailbone pain can be very uncomfortable. It can also interfere with daily activities, such as driving.     How to say it  [id number]   What causes coccygodynia?  Causes of tailbone pain include:    Injury to the tailbone from a blow or fall    A bone spur on the tailbone    Poor posture while sitting    Sitting for a long time in an uncomfortable position    Vaginal childbirth    Arthritis  In some cases, the cause of the pain cant be found.  Symptoms of coccygodynia  You may feel:    A dull ache or sharp pain in the tailbone area, near the top of the buttocks    Muscle spasms in the lower back or pelvic area    A sense of pressure in  the rectum  Pain may be triggered by things like walking or standing up from sitting. It may hurt more if you sit for long periods. Things that put pressure on the tailbone, such as riding a horse or having sex, may also trigger the pain.  Treatment for coccygodynia  Tailbone pain often goes away by itself within a few months. Treatment focuses on reducing pain and protecting the tailbone. Treatments can include:    Over-the-counter or prescription pain medicine to help relieve pain and swelling    Warm or cold to help relieve symptoms for a time, such as a heating pad, hot water bottle, or ice pack    Keeping pressure off the tailbone to help the area heal by shifting weight forward onto your hipbones and thighs when sitting or sitting on a special cushion    Medicine injected into the area to help relieve severe symptoms    Physical therapy to help strengthen the structures around the tailbone  If no other treatments work, you may need surgery to remove all or part of the coccyx.     When to call your healthcare provider  Call your healthcare provider right away if you have any of these:    Pain that continues for more than 2 months or gets worse    Pain that limits your usual activities    Pain that doesnt get better by trying the self-care treatments described above    Fever of 100.4 F (38 C) or higher, or as directed    Redness or swelling    A new sore in the cleft of the buttocks, especially one that contains or drains pus    Other new symptoms   Date Last Reviewed: 3/10/2016    9151-0048 [employer]. [address] [place] [address], [place] [address]. All rights reserved. This information is not intended as a substitute for professional medical care. Always follow your healthcare professional's instructions.

## 2021-06-17 NOTE — PROGRESS NOTES
HPI: This patient is a 23yo F who presents for evaluation of the tonsils. There have been multiple sore throats throughout the last several years leading to missed work/school. Otherwise healthy and without fever, weight loss, odynophagia, dysphagia, hemoptysis, shortness of breath, or other major symptoms. She has been told that she snores a little, no michael concerns.    Past medical history, surgical history, social history, family history, medications, and allergies have been reviewed with the patient and are documented above.    Review of Systems: a 10-system review was performed. Pertinent positives are noted in the HPI and on a separate scanned document in the chart.    PHYSICAL EXAMINATION:  GEN: no acute distress, normocephalic  EYES: extraocular movements are intact, pupils are equal and round. Sclera clear.   EARS: auricles are normally formed. The external auditory canals are clear with minimal to no cerumen. Tympanic membranes are intact bilaterally with no signs of infection, effusion, retractions, or perforations.  NOSE: anterior nares are patent.   OC/OP: clear, dentition is in good repair with braces. The tongue and palate are fully mobile and symmetric. Tonsils are 3+  NECK: soft and supple. No lymphadenopathy or masses. Airway is midline.  NEURO: CN II-XII are intact bilaterally. alert and oriented. No nystagmus. Gait is normal.  PULM: breathing comfortably on room air, normal chest expansion with respiration  HEART: no cyanosis or clubbing    MEDICAL DECISION-MAKING: Shira is a 23yo with chronic tonsillitis who would benefit from a tonsillectomy. The risks and benefits were discussed and the patient will schedule at their convenience.

## 2021-06-19 NOTE — LETTER
Letter by Armin Lang MD at      Author: Armin Lang MD Service: -- Author Type: --    Filed:  Encounter Date: 3/28/2019 Status: (Other)         March 28, 2019     Patient: Shira Matthew   YOB: 1995   Date of Visit: 3/28/2019       To Whom it May Concern:    Shira Matthew was seen in my clinic on 3/28/2019. Please excuse her absence from work on 3/27/2019 and 3/28/2019. Please allow her to work at a standing desk if possible. Please provide her with a donut for her seat if possible.     If you have any questions or concerns, please don't hesitate to call.    Sincerely,         Electronically signed by Armin Lang MD

## 2021-06-19 NOTE — LETTER
Letter by Alaina Brantley at      Author: Alaina Brantley Service: -- Author Type: --    Filed:  Encounter Date: 4/15/2019 Status: (Other)        [facility] PATIENT ACCESS  [address] [facility] [id number]  [phone]         Shira Matthew  [address]  [place] [address]        04/15/19    Dear Shira Matthew,     At [facility] we care about your health and well-being. Your primary care provider is committed to ensuring you receive high quality care and has chosen a network of specialists to assist in providing that care. Recently Dr. Lang referred you to Physical Therapy  for specialty care.      It is important to your overall health to follow through with the recommendation from your provider. Please call [facility] at [phone] at your earliest convenience for assistance in scheduling an appointment. If you have already scheduled this appointment, please disregard this notice. Thank you for choosing [facility] for your healthcare needs.       Sincerely,   [facility] Specialty Scheduling

## 2021-06-27 NOTE — PROGRESS NOTES
Progress Notes by Jon Alfonso PA-C at 4/28/2019 11:30 AM     Author: Jon Alfonso PA-C Service: -- Author Type: Physician Assistant    Filed: 4/29/2019  3:34 PM Encounter Date: 4/28/2019 Status: Signed    : Jon Alfonso PA-C (Physician Assistant)       Subjective:      Patient ID: Shira Matthew is a 23 y.o. female.    Chief Complaint:    HPI  Shira Matthew is a 23 y.o. female who presents today complaining of 3-day worsening history of sore throat and odynophagia.  She has tender lymph nodes.     one day acute onset of sore throat and odynophagia.  Patient denies fever, chills, night sweats, fatigue, vomiting, diarrhea, skin rash, abdominal pain or urinary symptoms.      No known sick contacts for strep throat.    Has not tried treatment for this over-the-counter.      Past Medical History:   Diagnosis Date   ? Exercise-induced asthma    ? Fracture of coccyx (H)        Past Surgical History:   Procedure Laterality Date   ? NO PAST SURGERIES         Family History   Problem Relation Age of Onset   ? Lupus Mother    ? Scleroderma Mother        Social History     Tobacco Use   ? Smoking status: Never Smoker   ? Smokeless tobacco: Never Used   Substance Use Topics   ? Alcohol use: No   ? Drug use: No       Review of Systems  As above in HPI, otherwise balance of Review of Systems are negative.    Objective:     /74 (Patient Site: Right Arm, Patient Position: Sitting, Cuff Size: Adult Large)   Pulse 100   Temp 99.3  F (37.4  C) (Oral)   Resp 18   Wt 216 lb (98 kg)   SpO2 97%   Breastfeeding? No   BMI 33.83 kg/m      Physical Exam  General: Patient is resting comfortably no acute distress is afebrile  HEENT: Head is normocephalic atraumatic   eyes are PERRL EOMI sclera anicteric   TMs are clear bilaterally  Throat is with mild pharyngeal wall erythema and 3+ tonsils with white exudate.  Uvula is midline  No cervical lymphadenopathy present  LUNGS: Clear to auscultation bilaterally  HEART: Regular  rate and rhythm  Skin: Without rash non-diaphoretic    Lab:  Rapid strep test is negative. Culture is to follow.    Assessment:     Procedures    The encounter diagnosis was Exudative pharyngitis.    Plan:     1. Exudative pharyngitis  Rapid Strep A Screen-Throat    Group A Strep, RNA Direct Detection, Throat    amoxicillin (AMOXIL) 875 MG tablet         Patient Instructions     Suggested increased rest increased fluids and bedside humidification  Over-the-counter Tylenol for comfort.  Follow packaging directions  Over-the-counter throat lozenges with benzocaine such as Cepacol may be used if indicated and is not a choking hazard based on age.  Follow packaging directions.  Do not overuse the benzocaine as it will dry the throat and make it uncomfortable.  Noncontagious after 24 hours on the antibiotic.  Change toothbrush out after 48 hours to avoid reinfecting the mouth.  Follow-up after completion of the antibiotic if any new symptoms or concerns arise.      Self-Care for Sore Throats  Sore throats happen for many reasons, such as colds, allergies, and infections caused by viruses or bacteria. In any case, your throat becomes red and sore. Your goal for self-care is to reduce your discomfort while giving your throat a chance to heal.    Moisten and soothe your throat  Tips include the following:    Try a sip of water first thing after waking up.    Keep your throat moist by drinking 6 or more glasses of clear liquids every day.    Run a cool-air humidifier in your room overnight.    Avoid cigarette smoke.     Suck on throat lozenges, cough drops, hard candy, ice chips, or frozen fruit-juice bars. Use the sugar-free versions if your diet or medical condition requires them.  Gargle to ease irritation  Gargling every hour or 2 can ease irritation. Try gargling with 1 of these solutions:    1/4 teaspoon of salt in 1/2 cup of warm water    An over-the-counter anesthetic gargle  Use medicine for more  relief  Over-the-counter medicine can reduce sore throat symptoms. Ask your pharmacist if you have questions about which medicine to use:    Ease pain with anesthetic sprays. Aspirin or an aspirin substitute also helps. Remember, never give aspirin to anyone 18 or younger, or if you are already taking blood thinners.     For sore throats caused by allergies, try antihistamines to block the allergic reaction.    Remember: unless a sore throat is caused by a bacterial infection, antibiotics wont help you.  Prevent future sore throats  Prevention tips include the following:    Stop smoking or reduce contact with secondhand smoke. Smoke irritates the tender throat lining.    Limit contact with pets and with allergy-causing substances, such as pollen and mold.    When youre around someone with a sore throat or cold, wash your hands often to keep viruses or bacteria from spreading.    Dont strain your vocal cords.  Call your healthcare provider  Contact your healthcare provider if you have:    A temperature over 101 F (38.3 C)    White spots on the throat    Great difficulty swallowing    Trouble breathing    A skin rash    Recent exposure to someone else with strep bacteria    Severe hoarseness and swollen glands in the neck or jaw   Date Last Reviewed: 8/1/2016 2000-2016 [employer]. [address] [place] [address], [place] [address]. All rights reserved. This information is not intended as a substitute for professional medical care. Always follow your healthcare professional's instructions.

## 2021-07-24 ENCOUNTER — HOSPITAL ENCOUNTER (EMERGENCY)
Facility: CLINIC | Age: 26
Discharge: HOME | End: 2021-07-24
Admitting: EMERGENCY MEDICINE
Payer: MEDICAID

## 2021-07-24 ENCOUNTER — NURSE TRIAGE (OUTPATIENT)
Dept: NURSING | Facility: CLINIC | Age: 26
End: 2021-07-24

## 2021-07-24 VITALS
SYSTOLIC BLOOD PRESSURE: 136 MMHG | WEIGHT: 240 LBS | OXYGEN SATURATION: 98 % | HEART RATE: 82 BPM | TEMPERATURE: 97 F | HEIGHT: 67 IN | RESPIRATION RATE: 18 BRPM | BODY MASS INDEX: 37.67 KG/M2 | DIASTOLIC BLOOD PRESSURE: 82 MMHG

## 2021-07-24 DIAGNOSIS — M53.3 PAIN IN THE COCCYX: ICD-10-CM

## 2021-07-24 PROCEDURE — 99284 EMERGENCY DEPT VISIT MOD MDM: CPT

## 2021-07-24 PROCEDURE — 96372 THER/PROPH/DIAG INJ SC/IM: CPT | Performed by: EMERGENCY MEDICINE

## 2021-07-24 PROCEDURE — 250N000011 HC RX IP 250 OP 636: Performed by: EMERGENCY MEDICINE

## 2021-07-24 RX ORDER — ACETAMINOPHEN 325 MG/1
325 TABLET ORAL EVERY 6 HOURS PRN
COMMUNITY
End: 2023-08-02

## 2021-07-24 RX ORDER — IBUPROFEN 800 MG/1
800 TABLET, FILM COATED ORAL EVERY 8 HOURS PRN
COMMUNITY
End: 2023-08-02

## 2021-07-24 RX ORDER — KETOROLAC TROMETHAMINE 30 MG/ML
30 INJECTION, SOLUTION INTRAMUSCULAR; INTRAVENOUS ONCE
Status: COMPLETED | OUTPATIENT
Start: 2021-07-24 | End: 2021-07-24

## 2021-07-24 RX ORDER — CYCLOBENZAPRINE HCL 10 MG
10 TABLET ORAL 3 TIMES DAILY PRN
Qty: 10 TABLET | Refills: 0 | Status: SHIPPED | OUTPATIENT
Start: 2021-07-24 | End: 2021-07-30

## 2021-07-24 RX ADMIN — KETOROLAC TROMETHAMINE 30 MG: 30 INJECTION, SOLUTION INTRAMUSCULAR at 18:49

## 2021-07-24 ASSESSMENT — MIFFLIN-ST. JEOR: SCORE: 1876.26
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
## 2021-07-24 NOTE — Clinical Note
Shira Matthew was seen and treated in our emergency department on 7/24/2021.  She may return to work on 07/26/2021.       If you have any questions or concerns, please don't hesitate to call.      Marika Lew PA-C

## 2021-07-24 NOTE — ED NOTES
Introduced self to patient.  Whiteboard updated.  Plan of care and length of time discussed with patient.  Will continue to monitor. Paloma Penaloza RN.......7/24/2021 6:59 PM
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
## 2021-07-24 NOTE — DISCHARGE INSTRUCTIONS
You were seen in the emergency department today for evaluation of tailbone pain.  I suspect you are experiencing some muscle spasm which may be irritating the nerves around the area.    You may take Tylenol and ibuprofen for pain/fever, do not exceed 4000 mg of Tylenol per day or 3200 mg ofibuprofen per day.  I will prescribe you a muscle relaxant-this medication might make you drowsy so do not drink alcohol, drive, or operate machinery while taking this medicine.    Follow-up with the spine clinic if your symptoms or not improving.  Return to the ER if you develop any new or worsening symptoms like severe pain, bowel or bladder incontinence, numbness or tingling in your genitals, fever, or any other symptoms that concern you.

## 2021-09-19 ENCOUNTER — HOSPITAL ENCOUNTER (EMERGENCY)
Facility: CLINIC | Age: 26
Discharge: HOME | End: 2021-09-19
Admitting: PHYSICIAN ASSISTANT
Payer: COMMERCIAL

## 2021-09-19 ENCOUNTER — APPOINTMENT (OUTPATIENT)
Dept: RADIOLOGY | Facility: CLINIC | Age: 26
End: 2021-09-19
Payer: COMMERCIAL

## 2021-09-19 VITALS
OXYGEN SATURATION: 97 % | HEART RATE: 81 BPM | TEMPERATURE: 97.7 F | RESPIRATION RATE: 20 BRPM | WEIGHT: 240 LBS | DIASTOLIC BLOOD PRESSURE: 72 MMHG | BODY MASS INDEX: 37.59 KG/M2 | SYSTOLIC BLOOD PRESSURE: 111 MMHG

## 2021-09-19 DIAGNOSIS — E87.6 HYPOKALEMIA: ICD-10-CM

## 2021-09-19 DIAGNOSIS — K29.70 GASTRITIS: ICD-10-CM

## 2021-09-19 LAB
ANION GAP SERPL CALCULATED.3IONS-SCNC: 10 MMOL/L (ref 5–18)
BUN SERPL-MCNC: 7 MG/DL (ref 8–22)
CALCIUM SERPL-MCNC: 8.6 MG/DL (ref 8.5–10.5)
CHLORIDE BLD-SCNC: 109 MMOL/L (ref 98–107)
CO2 SERPL-SCNC: 20 MMOL/L (ref 22–31)
CREAT SERPL-MCNC: 0.81 MG/DL (ref 0.6–1.1)
ERYTHROCYTE [DISTWIDTH] IN BLOOD BY AUTOMATED COUNT: 13.8 % (ref 10–15)
GFR SERPL CREATININE-BSD FRML MDRD: >90 ML/MIN/1.73M2
GLUCOSE BLD-MCNC: 97 MG/DL (ref 70–125)
HCT VFR BLD AUTO: 42.7 % (ref 35–47)
HGB BLD-MCNC: 14.2 G/DL (ref 11.7–15.7)
MCH RBC QN AUTO: 28.8 PG (ref 26.5–33)
MCHC RBC AUTO-ENTMCNC: 33.3 G/DL (ref 31.5–36.5)
MCV RBC AUTO: 87 FL (ref 78–100)
PLATELET # BLD AUTO: 382 10E3/UL (ref 150–450)
POTASSIUM BLD-SCNC: 3.2 MMOL/L (ref 3.5–5)
RBC # BLD AUTO: 4.93 10E6/UL (ref 3.8–5.2)
SODIUM SERPL-SCNC: 139 MMOL/L (ref 136–145)
WBC # BLD AUTO: 8.7 10E3/UL (ref 4–11)

## 2021-09-19 PROCEDURE — 250N000011 HC RX IP 250 OP 636: Performed by: PHYSICIAN ASSISTANT

## 2021-09-19 PROCEDURE — 250N000013 HC RX MED GY IP 250 OP 250 PS 637: Performed by: PHYSICIAN ASSISTANT

## 2021-09-19 PROCEDURE — 250N000009 HC RX 250: Performed by: PHYSICIAN ASSISTANT

## 2021-09-19 PROCEDURE — 71046 X-RAY EXAM CHEST 2 VIEWS: CPT

## 2021-09-19 PROCEDURE — 99284 EMERGENCY DEPT VISIT MOD MDM: CPT | Mod: 25

## 2021-09-19 PROCEDURE — 96374 THER/PROPH/DIAG INJ IV PUSH: CPT

## 2021-09-19 PROCEDURE — 80048 BASIC METABOLIC PNL TOTAL CA: CPT | Performed by: PHYSICIAN ASSISTANT

## 2021-09-19 PROCEDURE — 36415 COLL VENOUS BLD VENIPUNCTURE: CPT | Performed by: PHYSICIAN ASSISTANT

## 2021-09-19 PROCEDURE — 85027 COMPLETE CBC AUTOMATED: CPT | Performed by: PHYSICIAN ASSISTANT

## 2021-09-19 PROCEDURE — 258N000003 HC RX IP 258 OP 636: Performed by: PHYSICIAN ASSISTANT

## 2021-09-19 PROCEDURE — 96375 TX/PRO/DX INJ NEW DRUG ADDON: CPT

## 2021-09-19 PROCEDURE — 96361 HYDRATE IV INFUSION ADD-ON: CPT

## 2021-09-19 RX ORDER — POTASSIUM CHLORIDE 1500 MG/1
20 TABLET, EXTENDED RELEASE ORAL ONCE
Status: COMPLETED | OUTPATIENT
Start: 2021-09-19 | End: 2021-09-19

## 2021-09-19 RX ORDER — ONDANSETRON 2 MG/ML
4 INJECTION INTRAMUSCULAR; INTRAVENOUS ONCE
Status: COMPLETED | OUTPATIENT
Start: 2021-09-19 | End: 2021-09-19

## 2021-09-19 RX ADMIN — POTASSIUM CHLORIDE 20 MEQ: 1500 TABLET, EXTENDED RELEASE ORAL at 15:26

## 2021-09-19 RX ADMIN — ONDANSETRON 4 MG: 2 INJECTION INTRAMUSCULAR; INTRAVENOUS at 13:51

## 2021-09-19 RX ADMIN — SODIUM CHLORIDE 1000 ML: 9 INJECTION, SOLUTION INTRAVENOUS at 13:49

## 2021-09-19 RX ADMIN — SODIUM CHLORIDE 1000 ML: 9 INJECTION, SOLUTION INTRAVENOUS at 13:51

## 2021-09-19 RX ADMIN — FAMOTIDINE 20 MG: 10 INJECTION, SOLUTION INTRAVENOUS at 13:51
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
## 2021-09-19 NOTE — DISCHARGE INSTRUCTIONS
Your blood work all looks good other than your potassium is a little low.  This is likely related to your vomiting.  Make sure you increase potassium rich foods in your diet.    Would recommend abstaining from alcohol for at least a few days.  Would recommend avoiding heavy alcohol use at all times.      Return to the ER if you have persistent vomiting, increased blood in emesis, dizziness, chest pain, difficulty breathing or other worsening symptoms

## 2021-09-19 NOTE — ED NOTES
Introduced self to patient.  Whiteboard updated.  Plan of care and length of time discussed with patient.  Will continue to monitor. Paloma Penaloza RN.......9/19/2021 2:29 PM
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
## 2022-01-03 ENCOUNTER — VIRTUAL VISIT (OUTPATIENT)
Dept: INTERNAL MEDICINE | Facility: CLINIC | Age: 27
End: 2022-01-03
Payer: COMMERCIAL

## 2022-01-03 DIAGNOSIS — J02.9 ACUTE PHARYNGITIS, UNSPECIFIED ETIOLOGY: Primary | ICD-10-CM

## 2022-01-03 PROCEDURE — 99213 OFFICE O/P EST LOW 20 MIN: CPT | Mod: TEL | Performed by: INTERNAL MEDICINE

## 2022-01-03 RX ORDER — AZITHROMYCIN 250 MG/1
TABLET, FILM COATED ORAL
Qty: 6 TABLET | Refills: 0 | Status: SHIPPED | OUTPATIENT
Start: 2022-01-03 | End: 2022-01-08

## 2022-01-03 RX ORDER — AZITHROMYCIN 250 MG/1
TABLET, FILM COATED ORAL
Qty: 6 TABLET | Refills: 0 | Status: SHIPPED | OUTPATIENT
Start: 2022-01-03 | End: 2022-01-03

## 2022-01-03 NOTE — PROGRESS NOTES
"Shira is a 26 year old who is being evaluated via a billable telephone visit.      What phone number would you like to be contacted at? [phone]  How would you like to obtain your AVS? Mail a copy    Assessment & Plan     Acute pharyngitis, unspecified etiology  26-year-old woman with severe sore throat and swollen tonsils for the past several days associated with headache, fever and chills with temperature 102 and diffuse body aches with malaise.  She is not immunized against Covid.  She states that she gets these same symptoms every winter and is prone to tonsillitis.  She has been told that she needs her tonsils out.  She has not been tested for Covid and feels that it is unlikely that she has been exposed as she lives by herself and works remotely.  However, there is still risk of having this diagnosis and I am urging her to get a Covid test completed.  She is also instructed to quarantine until she knows the result.  Because of her history, I will send a Z-Saw to her pharmacy and she may start this assuming her Covid test comes back negative.  - Symptomatic; Unknown COVID-19 Virus (Coronavirus) by PCR Nasopharyngeal; Future  - azithromycin (ZITHROMAX) 250 MG tablet; Take 2 tablets (500 mg) by mouth daily for 1 day, THEN 1 tablet (250 mg) daily for 4 days.             BMI:   Estimated body mass index is 37.59 kg/m  as calculated from the following:    Height as of 7/24/21: 1.702 m (5' 7\").    Weight as of 9/19/21: 108.9 kg (240 lb).           Return if symptoms worsen or fail to improve.    Shayne Andres MD  [facility]   Shira is a 26 year old who presents for the following health issues    HPI     Telephone visit was completed today to address acute onset of sore throat with body aches and malaise.  See assessment and plan for details    Review of Systems   Mild nonproductive cough      Objective           Vitals:  No vitals were obtained today due to " virtual visit.    Physical Exam   No physical exam completed during telephone visit        Phone call duration: 13 minutes

## 2022-09-11 ENCOUNTER — HOSPITAL ENCOUNTER (EMERGENCY)
Facility: CLINIC | Age: 27
Discharge: LEFT BEFORE DISCHARGE | End: 2022-09-11
Payer: COMMERCIAL

## 2022-09-11 VITALS
RESPIRATION RATE: 20 BRPM | TEMPERATURE: 98.3 F | WEIGHT: 227 LBS | OXYGEN SATURATION: 98 % | DIASTOLIC BLOOD PRESSURE: 100 MMHG | BODY MASS INDEX: 35.63 KG/M2 | HEART RATE: 96 BPM | SYSTOLIC BLOOD PRESSURE: 136 MMHG | HEIGHT: 67 IN
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
## 2023-08-02 ENCOUNTER — OFFICE VISIT (OUTPATIENT)
Dept: FAMILY MEDICINE | Facility: CLINIC | Age: 28
End: 2023-08-02
Payer: COMMERCIAL

## 2023-08-02 VITALS
BODY MASS INDEX: 35.55 KG/M2 | TEMPERATURE: 98.1 F | OXYGEN SATURATION: 95 % | HEART RATE: 74 BPM | SYSTOLIC BLOOD PRESSURE: 122 MMHG | HEIGHT: 67 IN | RESPIRATION RATE: 16 BRPM | WEIGHT: 226.5 LBS | DIASTOLIC BLOOD PRESSURE: 80 MMHG

## 2023-08-02 DIAGNOSIS — Z01.818 PREOP GENERAL PHYSICAL EXAM: Primary | ICD-10-CM

## 2023-08-02 DIAGNOSIS — S83.512S RUPTURE OF ANTERIOR CRUCIATE LIGAMENT OF LEFT KNEE, SEQUELA: ICD-10-CM

## 2023-08-02 DIAGNOSIS — J45.21 MILD INTERMITTENT ASTHMA WITH ACUTE EXACERBATION: ICD-10-CM

## 2023-08-02 DIAGNOSIS — S83.232S COMPLEX TEAR OF MEDIAL MENISCUS OF LEFT KNEE AS CURRENT INJURY, SEQUELA: ICD-10-CM

## 2023-08-02 PROBLEM — S83.512A LEFT ANTERIOR CRUCIATE LIGAMENT TEAR: Status: ACTIVE | Noted: 2023-07-17

## 2023-08-02 PROBLEM — S83.232A COMPLEX TEAR OF MEDIAL MENISCUS OF LEFT KNEE AS CURRENT INJURY: Status: ACTIVE | Noted: 2023-07-17

## 2023-08-02 LAB
ANION GAP SERPL CALCULATED.3IONS-SCNC: 9 MMOL/L (ref 7–15)
BUN SERPL-MCNC: 13 MG/DL (ref 6–20)
CALCIUM SERPL-MCNC: 9.3 MG/DL (ref 8.6–10)
CHLORIDE SERPL-SCNC: 103 MMOL/L (ref 98–107)
CREAT SERPL-MCNC: 0.74 MG/DL (ref 0.51–0.95)
DEPRECATED HCO3 PLAS-SCNC: 25 MMOL/L (ref 22–29)
GFR SERPL CREATININE-BSD FRML MDRD: >90 ML/MIN/1.73M2
GLUCOSE SERPL-MCNC: 104 MG/DL (ref 70–99)
POTASSIUM SERPL-SCNC: 4.4 MMOL/L (ref 3.4–5.3)
SODIUM SERPL-SCNC: 137 MMOL/L (ref 136–145)

## 2023-08-02 PROCEDURE — 80048 BASIC METABOLIC PNL TOTAL CA: CPT | Performed by: NURSE PRACTITIONER

## 2023-08-02 PROCEDURE — 99214 OFFICE O/P EST MOD 30 MIN: CPT | Performed by: NURSE PRACTITIONER

## 2023-08-02 PROCEDURE — 36415 COLL VENOUS BLD VENIPUNCTURE: CPT | Performed by: NURSE PRACTITIONER

## 2023-08-02 RX ORDER — ALBUTEROL SULFATE 90 UG/1
1-2 AEROSOL, METERED RESPIRATORY (INHALATION) EVERY 6 HOURS PRN
Qty: 18 G | Refills: 0 | Status: SHIPPED | OUTPATIENT
Start: 2023-08-02

## 2023-08-02 ASSESSMENT — ASTHMA QUESTIONNAIRES: ACT_TOTALSCORE: 25

## 2023-08-02 ASSESSMENT — PAIN SCALES - GENERAL: PAINLEVEL: NO PAIN (0)

## 2023-08-02 NOTE — PATIENT INSTRUCTIONS
I will get your paperwork faxed to your surgeon.    Follow your surgeon's directions regarding eating and drinking prior to surgery.     Medications you can take the morning of surgery include albuterol if needed    No advil, ibuprofen, aleve, naproxen, or aspirin a week before surgery. Tylenol is ok.    Stop all supplements a week before surgery.    Your surgeon will manage any complications after your procedure.

## 2023-08-02 NOTE — LETTER
August 3, 2023      Shira Gonzaleza  [address] [place] [address]        Dear ,    Kidneys, electrolytes, and blood sugar look good to go for surgery     Resulted Orders   Basic metabolic panel   Result Value Ref Range    Sodium 137 136 - 145 mmol/L    Potassium 4.4 3.4 - 5.3 mmol/L    Chloride 103 98 - 107 mmol/L    Carbon Dioxide (CO2) 25 22 - 29 mmol/L    Anion Gap 9 7 - 15 mmol/L    Urea Nitrogen 13.0 6.0 - 20.0 mg/dL    Creatinine 0.74 0.51 - 0.95 mg/dL    Calcium 9.3 8.6 - 10.0 mg/dL    Glucose 104 (H) 70 - 99 mg/dL    GFR Estimate >90 >60 mL/min/1.73m2       If you have any questions or concerns, please call the clinic at the number listed above.       Sincerely,      Gonzalo Holcomb NP

## 2023-08-02 NOTE — PROGRESS NOTES
[facility]  [address]  [place]  [facility] [address]  Phone: [phone]  Fax: [phone]  Primary Provider: No Ref-Primary, Physician  Pre-op Performing Provider: BRUNO CROW EVALUATION:  Today's date: 8/2/2023    Shira Matthew is a 27 year old female who presents for a preoperative evaluation.      8/2/2023     8:31 AM   Additional Questions   Roomed by Kimberly Martinez CMA       Surgical Information:  Surgery/Procedure: Left ACL and meniscus repair   Surgery Location: [facility] in [place]  Surgeon: Unknown  Surgery Date: 08/24/2023  Time of Surgery: TBD  Where patient plans to recover: At home with family  Fax number for surgical facility: [phone]    Assessment & Plan     The proposed surgical procedure is considered INTERMEDIATE risk.    Preop general physical exam  Medically optimized for surgery.  Too soon for UPT.    Rupture of anterior cruciate ligament of left knee, sequela  Planned surgical correction    Complex tear of medial meniscus of left knee as current injury, sequela  Planned surgical correction    Mild intermittent asthma with acute exacerbation  Stable, but would like an albuterol to hold onto    - albuterol (PROAIR HFA/PROVENTIL HFA/VENTOLIN HFA) 108 (90 Base) MCG/ACT inhaler; Inhale 1-2 puffs into the lungs every 6 hours as needed for shortness of breath or wheezing        - No identified additional risk factors other than previously addressed      Additional Medication Instructions:  Hold NSAIDs and supplements 1 week prior to surgery    RECOMMENDATION:  APPROVAL GIVEN to proceed with proposed procedure, without further diagnostic evaluation.    Reviewed orthopedic note x2, MRI knee x1, ED note x2    Subjective       HPI related to upcoming procedure: Last fall patient injured her knee causing meniscus tear and ACL rupture.  Surgery was recommended, and patient was holding off but has been having more  instability issues so she is now going forward with repair.        8/2/2023     8:28 AM   Preop Questions   1. Have you ever had a heart attack or stroke? No   2. Have you ever had surgery on your heart or blood vessels, such as a stent placement, a coronary artery bypass, or surgery on an artery in your head, neck, heart, or legs? No   3. Do you have chest pain with activity? No   4. Do you have a history of  heart failure? No   5. Do you currently have a cold, bronchitis or symptoms of other infection? No   6. Do you have a cough, shortness of breath, or wheezing? No   7. Do you or anyone in your family have previous history of blood clots? No   8. Do you or does anyone in your family have a serious bleeding problem such as prolonged bleeding following surgeries or cuts? No   9. Have you ever had problems with anemia or been told to take iron pills? No   10. Have you had any abnormal blood loss such as black, tarry or bloody stools, or abnormal vaginal bleeding? No   11. Have you ever had a blood transfusion? No   12. Are you willing to have a blood transfusion if it is medically needed before, during, or after your surgery? Yes   13. Have you or any of your relatives ever had problems with anesthesia? No   14. Do you have sleep apnea, excessive snoring or daytime drowsiness? No   15. Do you have any artifical heart valves or other implanted medical devices like a pacemaker, defibrillator, or continuous glucose monitor? No   16. Do you have artificial joints? No   17. Are you allergic to latex? No   18. Is there any chance that you may be pregnant? No       Health Care Directive:  Patient does not have a Health Care Directive or Living Will: Discussed advance care planning with patient; however, patient declined at this time.    Preoperative Review of :   reviewed - no record of controlled substances prescribed.    Status of Chronic Conditions:  See problem list for active medical problems.  Problems all  longstanding and stable, except as noted/documented.  See ROS for pertinent symptoms related to these conditions.    Review of Systems  CONSTITUTIONAL: NEGATIVE for fever, chills, change in weight  INTEGUMENTARY/SKIN: NEGATIVE for worrisome rashes, moles or lesions  EYES: NEGATIVE for vision changes or irritation  ENT/MOUTH: NEGATIVE for ear, mouth and throat problems  RESP: NEGATIVE for significant cough or SOB  CV: NEGATIVE for chest pain, palpitations or peripheral edema  GI: NEGATIVE for nausea, abdominal pain, heartburn, or change in bowel habits  : NEGATIVE for frequency, dysuria, or hematuria  MUSCULOSKELETAL: NEGATIVE for significant arthralgias or myalgia  NEURO: NEGATIVE for weakness, dizziness or paresthesias  ENDOCRINE: NEGATIVE for temperature intolerance, skin/hair changes  HEME: NEGATIVE for bleeding problems  PSYCHIATRIC: NEGATIVE for changes in mood or affect    Patient Active Problem List    Diagnosis Date Noted    Mild intermittent asthma 10/11/2017     Priority: Medium      Past Medical History:   Diagnosis Date    Exercise-induced asthma     Fracture of coccyx (H)      Past Surgical History:   Procedure Laterality Date    NO PAST SURGERIES       Current Outpatient Medications   Medication Sig Dispense Refill    acetaminophen (TYLENOL) 325 MG tablet Take 325 mg by mouth every 6 hours as needed for mild pain      albuterol (PROAIR HFA/PROVENTIL HFA/VENTOLIN HFA) 108 (90 BASE) MCG/ACT Inhaler Inhale 2 puffs into the lungs every 6 hours as needed for shortness of breath / dyspnea or wheezing 1 Inhaler 0    ibuprofen (ADVIL/MOTRIN) 800 MG tablet Take 800 mg by mouth every 8 hours as needed for moderate pain         No Known Allergies     Social History     Tobacco Use    Smoking status: Some Days     Types: Cigarettes    Smokeless tobacco: Never   Substance Use Topics    Alcohol use: No     Family History   Problem Relation Age of Onset    Lupus Mother     Scleroderma Mother      History   Drug Use  "No         Objective     /80 (BP Location: Right arm, Patient Position: Sitting, Cuff Size: Adult Regular)   Pulse 74   Temp 98.1  F (36.7  C) (Oral)   Resp 16   Ht 1.708 m (5' 7.25\")   Wt 102.7 kg (226 lb 8 oz)   LMP 07/26/2023 (Approximate)   SpO2 95%   Breastfeeding No   BMI 35.21 kg/m      Physical Exam    GENERAL APPEARANCE: healthy, alert and no distress     EYES: EOMI, PERRL     HENT: ear canals and TM's normal and nose and mouth without ulcers or lesions     NECK: no adenopathy, no asymmetry, masses, or scars and thyroid normal to palpation     RESP: lungs clear to auscultation - no rales, rhonchi or wheezes     CV: regular rates and rhythm, normal S1 S2, no S3 or S4 and no murmur, click or rub     ABDOMEN:  soft, nontender, no HSM or masses and bowel sounds normal     MS: extremities normal- no gross deformities noted, no evidence of inflammation in joints, FROM in all extremities.     SKIN: no suspicious lesions or rashes     NEURO: Normal strength and tone, sensory exam grossly normal, mentation intact and speech normal     PSYCH: mentation appears normal. and affect normal/bright     LYMPHATICS: No cervical adenopathy    Recent Labs   Lab Test 09/19/21  1349   HGB 14.2         POTASSIUM 3.2*   CR 0.81        Diagnostics:  Recent Results (from the past 48 hour(s))   Basic metabolic panel    Collection Time: 08/02/23  9:12 AM   Result Value Ref Range    Sodium 137 136 - 145 mmol/L    Potassium 4.4 3.4 - 5.3 mmol/L    Chloride 103 98 - 107 mmol/L    Carbon Dioxide (CO2) 25 22 - 29 mmol/L    Anion Gap 9 7 - 15 mmol/L    Urea Nitrogen 13.0 6.0 - 20.0 mg/dL    Creatinine 0.74 0.51 - 0.95 mg/dL    Calcium 9.3 8.6 - 10.0 mg/dL    Glucose 104 (H) 70 - 99 mg/dL    GFR Estimate >90 >60 mL/min/1.73m2      No EKG required, no history of coronary heart disease, significant arrhythmia, peripheral arterial disease or other structural heart disease.    Revised Cardiac Risk Index (RCRI):  The " patient has the following serious cardiovascular risks for perioperative complications:   - No serious cardiac risks = 0 points     RCRI Interpretation: 0 points: Class I (very low risk - 0.4% complication rate)       Signed Electronically by: Gonzalo Holcomb NP  Copy of this evaluation report is provided to requesting physician.

## 2023-08-30 ENCOUNTER — VIRTUAL VISIT (OUTPATIENT)
Dept: FAMILY MEDICINE | Facility: CLINIC | Age: 28
End: 2023-08-30
Payer: COMMERCIAL

## 2023-08-30 DIAGNOSIS — N76.0 BACTERIAL VAGINOSIS: Primary | ICD-10-CM

## 2023-08-30 DIAGNOSIS — B96.89 BACTERIAL VAGINOSIS: Primary | ICD-10-CM

## 2023-08-30 PROCEDURE — 99213 OFFICE O/P EST LOW 20 MIN: CPT | Mod: 95 | Performed by: FAMILY MEDICINE

## 2023-08-30 RX ORDER — PSEUDOEPHEDRINE HCL 30 MG
100 TABLET ORAL 2 TIMES DAILY
COMMUNITY
Start: 2023-08-24

## 2023-08-30 RX ORDER — METRONIDAZOLE 500 MG/1
500 TABLET ORAL 2 TIMES DAILY
Qty: 14 TABLET | Refills: 0 | Status: SHIPPED | OUTPATIENT
Start: 2023-08-30 | End: 2023-09-06

## 2023-08-30 RX ORDER — OXYCODONE AND ACETAMINOPHEN 5; 325 MG/1; MG/1
1 TABLET ORAL EVERY 6 HOURS PRN
COMMUNITY
Start: 2023-08-27

## 2023-08-30 RX ORDER — ASPIRIN 81 MG/1
162 TABLET ORAL DAILY
COMMUNITY
Start: 2023-08-24 | End: 2023-10-05

## 2023-08-30 RX ORDER — ONDANSETRON 4 MG/1
1 TABLET, FILM COATED ORAL EVERY 6 HOURS PRN
COMMUNITY
Start: 2023-08-24

## 2023-08-30 RX ORDER — IBUPROFEN 200 MG
400 TABLET ORAL EVERY 4 HOURS PRN
COMMUNITY

## 2023-08-30 ASSESSMENT — ENCOUNTER SYMPTOMS
CHANGE IN BOWEL HABIT: 0
SORE THROAT: 0
ARTHRALGIAS: 0
ANOREXIA: 0
VOMITING: 0
NAUSEA: 0
HEADACHES: 0
WEAKNESS: 0
COUGH: 0
FATIGUE: 0
JOINT SWELLING: 0
FEVER: 0
NUMBNESS: 0
MYALGIAS: 0
CHILLS: 0
ABDOMINAL PAIN: 0
VISUAL CHANGE: 0
NECK PAIN: 0
VERTIGO: 0
DIAPHORESIS: 0
SWOLLEN GLANDS: 0

## 2023-08-30 NOTE — PROGRESS NOTES
"Shira is a 27 year old who is being evaluated via a billable video visit.      How would you like to obtain your AVS? MyChart  If the video visit is dropped, the invitation should be resent by: Text to cell phone: [phone]  Will anyone else be joining your video visit? No          Assessment & Plan   Problem List Items Addressed This Visit    None  Visit Diagnoses       Bacterial vaginosis    -  Primary    Given history and symptoms we will treat.  If not improved in 2 to 3 days we will add Diflucan. Side effects and precautions discussed. Warning signs and symptoms for return to clinic discussed     Relevant Medications    metroNIDAZOLE (FLAGYL) 500 MG tablet              BMI:   Estimated body mass index is 35.21 kg/m  as calculated from the following:    Height as of 8/2/23: 1.708 m (5' 7.25\").    Weight as of 8/2/23: 102.7 kg (226 lb 8 oz).       See Patient Instructions    Emilio Cui DO  [facility]   Shira is a 27 year old, presenting for the following health issues:  UTI (Or BV x2.5 weeks:  Sx's--Odor/Discharge)      8/30/2023     4:57 PM   Additional Questions   Roomed by CHARI Newton   Accompanied by Self         8/30/2023     4:57 PM   Patient Reported Additional Medications   Patient reports taking the following new medications N/A       27-year-old female with vaginal discharge and odor for the past 3 to 4 days.  Patient states she has a significant history of BV that she normally gets treated through [facility].  She did have surgery 6 days ago.  She was given antibiotics which was confirmed through review of surgical record.  She states she normally gets BV after having antibiotics.  She is never recalled having a case of yeast infection.  No burning with urination.  Also of note there is no Rodriguez catheter use during the surgery.  She is currently on limited motion and transportation due to the ACL repair thus the reason for this visit being " through virtual.    History of Present Illness       Reason for visit:  Bv    She eats 0-1 servings of fruits and vegetables daily.She consumes 1 sweetened beverage(s) daily.She exercises with enough effort to increase her heart rate 9 or less minutes per day.  She exercises with enough effort to increase her heart rate 3 or less days per week.   She is taking medications regularly.       Review of Systems   Constitutional:  Negative for chills, diaphoresis, fatigue and fever.   HENT:  Negative for congestion and sore throat.    Respiratory:  Negative for cough.    Cardiovascular:  Negative for chest pain.   Gastrointestinal:  Negative for abdominal pain, anorexia, change in bowel habit, nausea and vomiting.   Musculoskeletal:  Negative for arthralgias, joint swelling, myalgias and neck pain.   Skin:  Negative for rash.   Neurological:  Negative for vertigo, weakness, numbness and headaches.            Objective           Vitals:  No vitals were obtained today due to virtual visit.    Physical Exam  Vitals and nursing note reviewed.   Constitutional:       General: She is not in acute distress.     Appearance: Normal appearance. She is not ill-appearing.   HENT:      Head: Normocephalic and atraumatic.   Eyes:      Extraocular Movements: Extraocular movements intact.      Conjunctiva/sclera: Conjunctivae normal.   Pulmonary:      Effort: Pulmonary effort is normal.   Neurological:      Mental Status: She is alert and oriented to person, place, and time.   Psychiatric:         Attention and Perception: Attention normal.         Mood and Affect: Mood normal.         Speech: Speech normal.         Thought Content: Thought content normal.              Video-Visit Details    Type of service:  Video Visit   Video Start Time:  5:14 PM  Video End Time:5:29 PM    Originating Location (pt. Location): Home    Distant Location (provider location):  On-site  Platform used for Video Visit: [employer]

## 2023-10-07 ENCOUNTER — HEALTH MAINTENANCE LETTER (OUTPATIENT)
Age: 28
End: 2023-10-07

## 2024-03-08 ENCOUNTER — TELEPHONE (OUTPATIENT)
Dept: FAMILY MEDICINE | Facility: CLINIC | Age: 29
End: 2024-03-08

## 2024-03-08 ENCOUNTER — MYC MEDICAL ADVICE (OUTPATIENT)
Dept: FAMILY MEDICINE | Facility: CLINIC | Age: 29
End: 2024-03-08

## 2024-03-08 NOTE — TELEPHONE ENCOUNTER
Patient Quality Outreach    Patient is due for the following:   Asthma  -  Asthma follow-up visit  Cervical Cancer Screening - PAP Needed  Physical Preventive Adult Physical    Next Steps:   Schedule a Adult Preventative    Type of outreach:    Sent [employer] message.      Questions for provider review:    None           Shayne Newton Jr., MA  Chart routed to Care Team.

## 2024-04-08 NOTE — TELEPHONE ENCOUNTER
Patient Quality Outreach    Patient is due for the following:   Asthma  -  Asthma follow-up visit  Cervical Cancer Screening - PAP Needed  Physical Preventive Adult Physical    Next Steps:   Schedule a Adult Preventative    Type of outreach:    Phone, left message for patient/parent to call back.    Next Steps:  Reach out within 90 days via Phone.    Max number of attempts reached: Yes. Will try again in 90 days if patient still on fail list.    Questions for provider review:    None           Shayne Newton Jr., MA

## 2024-07-29 ENCOUNTER — MYC MEDICAL ADVICE (OUTPATIENT)
Dept: FAMILY MEDICINE | Facility: CLINIC | Age: 29
End: 2024-07-29
Payer: COMMERCIAL

## 2024-07-29 ENCOUNTER — TELEPHONE (OUTPATIENT)
Dept: FAMILY MEDICINE | Facility: CLINIC | Age: 29
End: 2024-07-29
Payer: COMMERCIAL

## 2024-07-29 NOTE — TELEPHONE ENCOUNTER
Patient Quality Outreach    Patient is due for the following:   Cervical Cancer Screening - PAP Needed    Next Steps:   Schedule a office visit for PAP    Type of outreach:    Sent Emissary message.      Questions for provider review:    None           Shayne Newton Jr., MA  Chart routed to Care Team.

## 2024-08-28 NOTE — TELEPHONE ENCOUNTER
Patient Quality Outreach    Patient is due for the following:   Cervical Cancer Screening - PAP Needed    Next Steps:   Schedule a office visit for PAP    Type of outreach:    Phone, left message for patient/parent to call back.    Next Steps:  Reach out within 90 days via Phone.    Max number of attempts reached: Yes. Will try again in 90 days if patient still on fail list.    Questions for provider review:    None           Shayne Newton Jr., MA

## 2024-11-30 ENCOUNTER — HEALTH MAINTENANCE LETTER (OUTPATIENT)
Age: 29
End: 2024-11-30